# Patient Record
Sex: MALE | Race: WHITE | NOT HISPANIC OR LATINO | Employment: FULL TIME | URBAN - NONMETROPOLITAN AREA
[De-identification: names, ages, dates, MRNs, and addresses within clinical notes are randomized per-mention and may not be internally consistent; named-entity substitution may affect disease eponyms.]

---

## 2017-03-01 ENCOUNTER — NON-PROVIDER VISIT (OUTPATIENT)
Dept: CARDIOLOGY | Facility: CLINIC | Age: 52
End: 2017-03-01
Payer: COMMERCIAL

## 2017-03-01 DIAGNOSIS — R01.1 UNDIAGNOSED CARDIAC MURMURS: ICD-10-CM

## 2017-03-01 LAB
LV EJECT FRACT  99904: 50
LV EJECT FRACT MOD 2C 99903: 61.1
LV EJECT FRACT MOD 4C 99902: 71.6
LV EJECT FRACT MOD BP 99901: 67.41

## 2017-03-02 DIAGNOSIS — R01.1 HEART MURMUR: ICD-10-CM

## 2017-03-03 ENCOUNTER — TELEPHONE (OUTPATIENT)
Dept: CARDIOLOGY | Facility: MEDICAL CENTER | Age: 52
End: 2017-03-03

## 2017-03-03 DIAGNOSIS — I34.81 MITRAL VALVE ANNULAR CALCIFICATION: ICD-10-CM

## 2017-03-03 DIAGNOSIS — R01.1 UNDIAGNOSED CARDIAC MURMURS: ICD-10-CM

## 2017-03-03 NOTE — TELEPHONE ENCOUNTER
Called patient and advised him of Dr. Moore's echocardiogram interpretation and plan. Patient verbalized understanding and is thankful for the call.    GLENROY EUBANKS

## 2017-03-03 NOTE — TELEPHONE ENCOUNTER
----- Message from Nina Moore M.D. sent at 3/2/2017  5:42 PM PST -----  Strong & normal heart function - only mild -moderate calcium in valve - repeat 1y

## 2018-02-16 ENCOUNTER — HOSPITAL ENCOUNTER (OUTPATIENT)
Dept: CARDIOLOGY | Facility: MEDICAL CENTER | Age: 53
End: 2018-02-16
Attending: INTERNAL MEDICINE
Payer: COMMERCIAL

## 2018-02-16 DIAGNOSIS — R01.1 UNDIAGNOSED CARDIAC MURMURS: ICD-10-CM

## 2018-02-16 DIAGNOSIS — I34.81 MITRAL VALVE ANNULAR CALCIFICATION: ICD-10-CM

## 2018-02-16 PROCEDURE — 93306 TTE W/DOPPLER COMPLETE: CPT

## 2018-02-21 LAB
LV EJECT FRACT  99904: 65
LV EJECT FRACT MOD 2C 99903: 80.11
LV EJECT FRACT MOD 4C 99902: 58.67
LV EJECT FRACT MOD BP 99901: 65.82

## 2018-02-27 ENCOUNTER — TELEPHONE (OUTPATIENT)
Dept: CARDIOLOGY | Facility: MEDICAL CENTER | Age: 53
End: 2018-02-27

## 2018-02-27 NOTE — TELEPHONE ENCOUNTER
----- Message -----  From: Nina Moore M.D.  Sent: 2/23/2018   9:26 AM  To: Leonarda Quesada R.N.    Great news  Strong heart - AV looks very stable  F/u as planned    ======================================    Called pt, discussed Echo results per Dr Moore, pt verbalizes understanding